# Patient Record
Sex: FEMALE | Race: WHITE | Employment: FULL TIME | URBAN - METROPOLITAN AREA
[De-identification: names, ages, dates, MRNs, and addresses within clinical notes are randomized per-mention and may not be internally consistent; named-entity substitution may affect disease eponyms.]

---

## 2022-10-31 ENCOUNTER — APPOINTMENT (OUTPATIENT)
Dept: GENERAL RADIOLOGY | Age: 32
End: 2022-10-31
Attending: PHYSICIAN ASSISTANT
Payer: COMMERCIAL

## 2022-10-31 ENCOUNTER — HOSPITAL ENCOUNTER (EMERGENCY)
Age: 32
Discharge: HOME | End: 2022-10-31
Attending: EMERGENCY MEDICINE
Payer: COMMERCIAL

## 2022-10-31 VITALS
BODY MASS INDEX: 24.99 KG/M2 | WEIGHT: 150 LBS | RESPIRATION RATE: 13 BRPM | DIASTOLIC BLOOD PRESSURE: 80 MMHG | HEIGHT: 65 IN | HEART RATE: 69 BPM | OXYGEN SATURATION: 99 % | SYSTOLIC BLOOD PRESSURE: 126 MMHG | TEMPERATURE: 98.3 F

## 2022-10-31 DIAGNOSIS — R07.89 ATYPICAL CHEST PAIN: Primary | ICD-10-CM

## 2022-10-31 DIAGNOSIS — R79.89 ELEVATED TSH: ICD-10-CM

## 2022-10-31 LAB
ALBUMIN SERPL-MCNC: 3.9 G/DL (ref 3.4–5)
ALBUMIN/GLOB SERPL: 1 {RATIO} (ref 0.8–1.7)
ALP SERPL-CCNC: 58 U/L (ref 45–117)
ALT SERPL-CCNC: 24 U/L (ref 13–56)
AMPHET UR QL SCN: POSITIVE
ANION GAP SERPL CALC-SCNC: 6 MMOL/L (ref 3–18)
APPEARANCE UR: CLEAR
AST SERPL-CCNC: 12 U/L (ref 10–38)
ATRIAL RATE: 97 BPM
BACTERIA URNS QL MICRO: ABNORMAL /HPF
BARBITURATES UR QL SCN: NEGATIVE
BASOPHILS # BLD: 0 K/UL (ref 0–0.1)
BASOPHILS NFR BLD: 0 % (ref 0–2)
BENZODIAZ UR QL: NEGATIVE
BILIRUB SERPL-MCNC: 0.4 MG/DL (ref 0.2–1)
BILIRUB UR QL: NEGATIVE
BNP SERPL-MCNC: 194 PG/ML (ref 0–450)
BUN SERPL-MCNC: 13 MG/DL (ref 7–18)
BUN/CREAT SERPL: 14 (ref 12–20)
CALCIUM SERPL-MCNC: 9.7 MG/DL (ref 8.5–10.1)
CALCULATED P AXIS, ECG09: 66 DEGREES
CALCULATED R AXIS, ECG10: 38 DEGREES
CALCULATED T AXIS, ECG11: 56 DEGREES
CANNABINOIDS UR QL SCN: POSITIVE
CHLORIDE SERPL-SCNC: 102 MMOL/L (ref 100–111)
CK SERPL-CCNC: 106 U/L (ref 26–192)
CO2 SERPL-SCNC: 26 MMOL/L (ref 21–32)
COCAINE UR QL SCN: NEGATIVE
COLOR UR: YELLOW
CREAT SERPL-MCNC: 0.9 MG/DL (ref 0.6–1.3)
D DIMER PPP FEU-MCNC: 0.29 UG/ML(FEU)
DIAGNOSIS, 93000: NORMAL
DIFFERENTIAL METHOD BLD: ABNORMAL
EOSINOPHIL # BLD: 0.4 K/UL (ref 0–0.4)
EOSINOPHIL NFR BLD: 3 % (ref 0–5)
EPITH CASTS URNS QL MICRO: ABNORMAL /LPF (ref 0–5)
ERYTHROCYTE [DISTWIDTH] IN BLOOD BY AUTOMATED COUNT: 12.3 % (ref 11.6–14.5)
FLUAV RNA SPEC QL NAA+PROBE: NOT DETECTED
FLUBV RNA SPEC QL NAA+PROBE: NOT DETECTED
GLOBULIN SER CALC-MCNC: 4 G/DL (ref 2–4)
GLUCOSE SERPL-MCNC: 77 MG/DL (ref 74–99)
GLUCOSE UR STRIP.AUTO-MCNC: NEGATIVE MG/DL
HCG SERPL QL: NEGATIVE
HCT VFR BLD AUTO: 42.1 % (ref 35–45)
HDSCOM,HDSCOM: ABNORMAL
HGB BLD-MCNC: 14.2 G/DL (ref 12–16)
HGB UR QL STRIP: NEGATIVE
IMM GRANULOCYTES # BLD AUTO: 0 K/UL (ref 0–0.04)
IMM GRANULOCYTES NFR BLD AUTO: 0 % (ref 0–0.5)
KETONES UR QL STRIP.AUTO: NEGATIVE MG/DL
LEUKOCYTE ESTERASE UR QL STRIP.AUTO: ABNORMAL
LIPASE SERPL-CCNC: 142 U/L (ref 73–393)
LYMPHOCYTES # BLD: 3.1 K/UL (ref 0.9–3.6)
LYMPHOCYTES NFR BLD: 25 % (ref 21–52)
MAGNESIUM SERPL-MCNC: 1.8 MG/DL (ref 1.6–2.6)
MCH RBC QN AUTO: 30.1 PG (ref 24–34)
MCHC RBC AUTO-ENTMCNC: 33.7 G/DL (ref 31–37)
MCV RBC AUTO: 89.2 FL (ref 78–100)
METHADONE UR QL: NEGATIVE
MONOCYTES # BLD: 0.7 K/UL (ref 0.05–1.2)
MONOCYTES NFR BLD: 6 % (ref 3–10)
NEUTS SEG # BLD: 8.1 K/UL (ref 1.8–8)
NEUTS SEG NFR BLD: 66 % (ref 40–73)
NITRITE UR QL STRIP.AUTO: NEGATIVE
NRBC # BLD: 0 K/UL (ref 0–0.01)
NRBC BLD-RTO: 0 PER 100 WBC
OPIATES UR QL: NEGATIVE
P-R INTERVAL, ECG05: 138 MS
PCP UR QL: NEGATIVE
PH UR STRIP: 7.5 [PH] (ref 5–8)
PLATELET # BLD AUTO: 318 K/UL (ref 135–420)
PMV BLD AUTO: 10 FL (ref 9.2–11.8)
POTASSIUM SERPL-SCNC: 3.3 MMOL/L (ref 3.5–5.5)
PROT SERPL-MCNC: 7.9 G/DL (ref 6.4–8.2)
PROT UR STRIP-MCNC: NEGATIVE MG/DL
Q-T INTERVAL, ECG07: 366 MS
QRS DURATION, ECG06: 86 MS
QTC CALCULATION (BEZET), ECG08: 464 MS
RBC # BLD AUTO: 4.72 M/UL (ref 4.2–5.3)
RBC #/AREA URNS HPF: ABNORMAL /HPF (ref 0–5)
SARS-COV-2, COV2: NOT DETECTED
SODIUM SERPL-SCNC: 134 MMOL/L (ref 136–145)
SP GR UR REFRACTOMETRY: 1.02 (ref 1–1.03)
TROPONIN-HIGH SENSITIVITY: 5 NG/L (ref 0–54)
TSH SERPL DL<=0.05 MIU/L-ACNC: 5 UIU/ML (ref 0.36–3.74)
UROBILINOGEN UR QL STRIP.AUTO: 0.2 EU/DL (ref 0.2–1)
VENTRICULAR RATE, ECG03: 97 BPM
WBC # BLD AUTO: 12.4 K/UL (ref 4.6–13.2)
WBC URNS QL MICRO: ABNORMAL /HPF (ref 0–5)

## 2022-10-31 PROCEDURE — 85379 FIBRIN DEGRADATION QUANT: CPT

## 2022-10-31 PROCEDURE — 83880 ASSAY OF NATRIURETIC PEPTIDE: CPT

## 2022-10-31 PROCEDURE — 80053 COMPREHEN METABOLIC PANEL: CPT

## 2022-10-31 PROCEDURE — 83735 ASSAY OF MAGNESIUM: CPT

## 2022-10-31 PROCEDURE — 81001 URINALYSIS AUTO W/SCOPE: CPT

## 2022-10-31 PROCEDURE — 84484 ASSAY OF TROPONIN QUANT: CPT

## 2022-10-31 PROCEDURE — 99285 EMERGENCY DEPT VISIT HI MDM: CPT

## 2022-10-31 PROCEDURE — 96374 THER/PROPH/DIAG INJ IV PUSH: CPT

## 2022-10-31 PROCEDURE — 74011250636 HC RX REV CODE- 250/636: Performed by: PHYSICIAN ASSISTANT

## 2022-10-31 PROCEDURE — 80307 DRUG TEST PRSMV CHEM ANLYZR: CPT

## 2022-10-31 PROCEDURE — 96375 TX/PRO/DX INJ NEW DRUG ADDON: CPT

## 2022-10-31 PROCEDURE — 71045 X-RAY EXAM CHEST 1 VIEW: CPT

## 2022-10-31 PROCEDURE — 93005 ELECTROCARDIOGRAM TRACING: CPT

## 2022-10-31 PROCEDURE — 82550 ASSAY OF CK (CPK): CPT

## 2022-10-31 PROCEDURE — 84443 ASSAY THYROID STIM HORMONE: CPT

## 2022-10-31 PROCEDURE — 85025 COMPLETE CBC W/AUTO DIFF WBC: CPT

## 2022-10-31 PROCEDURE — 87636 SARSCOV2 & INF A&B AMP PRB: CPT

## 2022-10-31 PROCEDURE — 84703 CHORIONIC GONADOTROPIN ASSAY: CPT

## 2022-10-31 PROCEDURE — 83690 ASSAY OF LIPASE: CPT

## 2022-10-31 RX ORDER — FLUOXETINE HYDROCHLORIDE 20 MG/1
CAPSULE ORAL DAILY
COMMUNITY

## 2022-10-31 RX ORDER — KETOROLAC TROMETHAMINE 30 MG/ML
30 INJECTION, SOLUTION INTRAMUSCULAR; INTRAVENOUS
Status: COMPLETED | OUTPATIENT
Start: 2022-10-31 | End: 2022-10-31

## 2022-10-31 RX ORDER — FAMOTIDINE 10 MG/ML
20 INJECTION INTRAVENOUS
Status: COMPLETED | OUTPATIENT
Start: 2022-10-31 | End: 2022-10-31

## 2022-10-31 RX ORDER — DEXTROAMPHETAMINE SACCHARATE, AMPHETAMINE ASPARTATE, DEXTROAMPHETAMINE SULFATE AND AMPHETAMINE SULFATE 5; 5; 5; 5 MG/1; MG/1; MG/1; MG/1
20 TABLET ORAL
COMMUNITY

## 2022-10-31 RX ADMIN — FAMOTIDINE 20 MG: 10 INJECTION, SOLUTION INTRAVENOUS at 18:03

## 2022-10-31 RX ADMIN — SODIUM CHLORIDE, POTASSIUM CHLORIDE, SODIUM LACTATE AND CALCIUM CHLORIDE 1000 ML: 600; 310; 30; 20 INJECTION, SOLUTION INTRAVENOUS at 18:02

## 2022-10-31 RX ADMIN — KETOROLAC TROMETHAMINE 30 MG: 30 INJECTION, SOLUTION INTRAMUSCULAR; INTRAVENOUS at 18:03

## 2022-10-31 NOTE — ED PROVIDER NOTES
EMERGENCY DEPARTMENT HISTORY AND PHYSICAL EXAM    Date: 10/31/2022  Patient Name: Becky Nagy    History of Presenting Illness     Chief Complaint   Patient presents with    Arm Pain    Chest Pain         History Provided By: Patient    Chief Complaint: chest pain, shakiness, lightheadedness    HPI(Context):   5:34 PM  Mirella Whitlock is a 28 y.o. female with PMHX of anxiety, ADHD, depression, oral birth control who presents to the emergency department C/O left sided chest pain. Associated sxs include radiation to left armpit and down left arm. Pt has fatigue, SOB, generalized weakness, and difficult concentrating. Pt also has occasional pain in left upper back and right arm. Sxs started this AM. Pt was recently changed from sertraline to Prozac 25 mg for past 2 weeks. Pt is on adderall 20 mg daily. Pt endorses recent travel to [place] for work and pt is on oral birth control. Pt denies fever, chills, vomiting, cough, congestion, rhinorrhea, sick contacts, leg swelling/pain, and any other sxs or complaints. PCP: Melody Loyola MD    Current Outpatient Medications   Medication Sig Dispense Refill    FLUoxetine (PROzac) 20 mg capsule Take  by mouth daily. dextroamphetamine-amphetamine (AdderalL) 20 mg tablet Take 20 mg by mouth. Past History     Past Medical History:  Past Medical History:   Diagnosis Date    Panic attacks        Past Surgical History:  History reviewed. No pertinent surgical history. Family History:  History reviewed. No pertinent family history. Social History:  Social History     Tobacco Use    Smoking status: Never   Substance Use Topics    Alcohol use: Yes     Comment: tiw    Drug use: Yes     Types: Marijuana     Comment: once a month       Allergies:  Not on File      Review of Systems   Review of Systems   Constitutional:  Positive for activity change. Negative for fever. HENT:  Negative for congestion, rhinorrhea and sore throat.     Respiratory:  Positive for shortness of breath. Cardiovascular:  Positive for chest pain. Negative for leg swelling. Gastrointestinal:  Negative for abdominal pain, nausea and vomiting. Musculoskeletal:  Positive for back pain and myalgias. Neurological:  Positive for weakness (generalized weakness) and light-headedness. All other systems reviewed and are negative. Physical Exam     Vitals:    10/31/22 1714 10/31/22 1802 10/31/22 1847 10/31/22 1918   BP: (!) 147/93 (!) 151/108 (!) 134/93 126/80   Pulse: 72 73 66 69   Resp: 11 14 16 13   Temp:       SpO2:  99% 100% 99%   Weight:       Height:         Physical Exam  Vitals and nursing note reviewed. Constitutional:       General: She is not in acute distress. Appearance: She is well-developed. She is not diaphoretic. Comments:  female in NAD. Alert. Answering questions. Following directions.  at bedside. HENT:      Head: Normocephalic and atraumatic. Jaw: No trismus. Right Ear: External ear normal. No swelling or tenderness. Tympanic membrane is not perforated, erythematous or bulging. Left Ear: External ear normal. No swelling or tenderness. Tympanic membrane is not perforated, erythematous or bulging. Nose: Nose normal. No mucosal edema, congestion or rhinorrhea. Mouth/Throat:      Mouth: Mucous membranes are dry. No oral lesions. Dentition: No dental abscesses. Pharynx: Uvula midline. No oropharyngeal exudate, posterior oropharyngeal erythema or uvula swelling. Tonsils: No tonsillar abscesses. Eyes:      General: No scleral icterus. Right eye: No discharge. Left eye: No discharge. Conjunctiva/sclera: Conjunctivae normal.   Cardiovascular:      Rate and Rhythm: Normal rate and regular rhythm. Heart sounds: Normal heart sounds. No murmur heard. No friction rub. No gallop.    Pulmonary:      Effort: Pulmonary effort is normal. No tachypnea, accessory muscle usage or respiratory distress. Breath sounds: Normal breath sounds. No decreased breath sounds, wheezing, rhonchi or rales. Musculoskeletal:         General: Normal range of motion. Cervical back: Normal range of motion. Right lower leg: No edema. Left lower leg: No edema. Skin:     General: Skin is warm and dry. Neurological:      Mental Status: She is alert and oriented to person, place, and time. GCS: GCS eye subscore is 4. GCS verbal subscore is 5. GCS motor subscore is 6. Cranial Nerves: Cranial nerves 2-12 are intact. Sensory: Sensation is intact. Motor: Motor function is intact. Comments: A&Ox4. Psychiatric:         Judgment: Judgment normal.           Diagnostic Study Results     Labs -     Recent Results (from the past 12 hour(s))   EKG, 12 LEAD, INITIAL    Collection Time: 10/31/22  3:04 PM   Result Value Ref Range    Ventricular Rate 97 BPM    Atrial Rate 97 BPM    P-R Interval 138 ms    QRS Duration 86 ms    Q-T Interval 366 ms    QTC Calculation (Bezet) 464 ms    Calculated P Axis 66 degrees    Calculated R Axis 38 degrees    Calculated T Axis 56 degrees    Diagnosis       Normal sinus rhythm  Normal ECG  No previous ECGs available     CBC WITH AUTOMATED DIFF    Collection Time: 10/31/22  4:45 PM   Result Value Ref Range    WBC 12.4 4.6 - 13.2 K/uL    RBC 4.72 4.20 - 5.30 M/uL    HGB 14.2 12.0 - 16.0 g/dL    HCT 42.1 35.0 - 45.0 %    MCV 89.2 78.0 - 100.0 FL    MCH 30.1 24.0 - 34.0 PG    MCHC 33.7 31.0 - 37.0 g/dL    RDW 12.3 11.6 - 14.5 %    PLATELET 967 687 - 890 K/uL    MPV 10.0 9.2 - 11.8 FL    NRBC 0.0 0  WBC    ABSOLUTE NRBC 0.00 0.00 - 0.01 K/uL    NEUTROPHILS 66 40 - 73 %    LYMPHOCYTES 25 21 - 52 %    MONOCYTES 6 3 - 10 %    EOSINOPHILS 3 0 - 5 %    BASOPHILS 0 0 - 2 %    IMMATURE GRANULOCYTES 0 0.0 - 0.5 %    ABS. NEUTROPHILS 8.1 (H) 1.8 - 8.0 K/UL    ABS. LYMPHOCYTES 3.1 0.9 - 3.6 K/UL    ABS. MONOCYTES 0.7 0.05 - 1.2 K/UL    ABS.  EOSINOPHILS 0.4 0.0 - 0.4 K/UL    ABS. BASOPHILS 0.0 0.0 - 0.1 K/UL    ABS. IMM. GRANS. 0.0 0.00 - 0.04 K/UL    DF AUTOMATED     METABOLIC PANEL, COMPREHENSIVE    Collection Time: 10/31/22  4:45 PM   Result Value Ref Range    Sodium 134 (L) 136 - 145 mmol/L    Potassium 3.3 (L) 3.5 - 5.5 mmol/L    Chloride 102 100 - 111 mmol/L    CO2 26 21 - 32 mmol/L    Anion gap 6 3.0 - 18 mmol/L    Glucose 77 74 - 99 mg/dL    BUN 13 7.0 - 18 MG/DL    Creatinine 0.90 0.6 - 1.3 MG/DL    BUN/Creatinine ratio 14 12 - 20      eGFR >60 >60 ml/min/1.73m2    Calcium 9.7 8.5 - 10.1 MG/DL    Bilirubin, total 0.4 0.2 - 1.0 MG/DL    ALT (SGPT) 24 13 - 56 U/L    AST (SGOT) 12 10 - 38 U/L    Alk.  phosphatase 58 45 - 117 U/L    Protein, total 7.9 6.4 - 8.2 g/dL    Albumin 3.9 3.4 - 5.0 g/dL    Globulin 4.0 2.0 - 4.0 g/dL    A-G Ratio 1.0 0.8 - 1.7     LIPASE    Collection Time: 10/31/22  4:45 PM   Result Value Ref Range    Lipase 142 73 - 393 U/L   MAGNESIUM    Collection Time: 10/31/22  4:45 PM   Result Value Ref Range    Magnesium 1.8 1.6 - 2.6 mg/dL   TROPONIN-HIGH SENSITIVITY    Collection Time: 10/31/22  4:45 PM   Result Value Ref Range    Troponin-High Sensitivity 5 0 - 54 ng/L   D DIMER    Collection Time: 10/31/22  4:45 PM   Result Value Ref Range    D DIMER 0.29 <0.46 ug/ml(FEU)   NT-PRO BNP    Collection Time: 10/31/22  4:45 PM   Result Value Ref Range    NT pro- 0 - 450 PG/ML   HCG QL SERUM    Collection Time: 10/31/22  4:45 PM   Result Value Ref Range    HCG, Ql. Negative NEG     URINALYSIS W/ RFLX MICROSCOPIC    Collection Time: 10/31/22  4:45 PM   Result Value Ref Range    Color YELLOW      Appearance CLEAR      Specific gravity 1.018 1.005 - 1.030      pH (UA) 7.5 5.0 - 8.0      Protein Negative NEG mg/dL    Glucose Negative NEG mg/dL    Ketone Negative NEG mg/dL    Bilirubin Negative NEG      Blood Negative NEG      Urobilinogen 0.2 0.2 - 1.0 EU/dL    Nitrites Negative NEG      Leukocyte Esterase TRACE (A) NEG     DRUG SCREEN, URINE Collection Time: 10/31/22  4:45 PM   Result Value Ref Range    BENZODIAZEPINES Negative NEG      BARBITURATES Negative NEG      THC (TH-CANNABINOL) Positive (A) NEG      OPIATES Negative NEG      PCP(PHENCYCLIDINE) Negative NEG      COCAINE Negative NEG      AMPHETAMINES Positive (A) NEG      METHADONE Negative NEG      HDSCOM (NOTE)    CK    Collection Time: 10/31/22  4:45 PM   Result Value Ref Range     26 - 192 U/L   URINE MICROSCOPIC ONLY    Collection Time: 10/31/22  4:45 PM   Result Value Ref Range    WBC 0 to 3 0 - 5 /hpf    RBC 0 to 1 0 - 5 /hpf    Epithelial cells 2+ 0 - 5 /lpf    Bacteria FEW (A) NEG /hpf   TSH 3RD GENERATION    Collection Time: 10/31/22  4:45 PM   Result Value Ref Range    TSH 5.00 (H) 0.36 - 3.74 uIU/mL   COVID-19 WITH INFLUENZA A/B    Collection Time: 10/31/22  6:00 PM   Result Value Ref Range    SARS-CoV-2 by PCR Not detected NOTD      Influenza A by PCR Not detected NOTD      Influenza B by PCR Not detected NOTD         Radiologic Studies -  XR CHEST PORT   Final Result      No acute cardiopulmonary process. CT Results  (Last 48 hours)      None          CXR Results  (Last 48 hours)                 10/31/22 1803  XR CHEST PORT Final result    Impression:      No acute cardiopulmonary process. Narrative:  CLINICAL HISTORY:  As above. COMPARISONS:  None. TECHNIQUE:  single frontal view of the chest       ------------------------------------------       FINDINGS:       Lungs:  No consolidation or pleural fluid. Mediastinum: Unremarkable.        Bones: No evidence of fracture or suspicious bone lesion.           ------------------------------------------               Medications given in the ED-  Medications   lactated ringers bolus infusion 1,000 mL (0 mL IntraVENous IV Completed 10/31/22 1832)   famotidine (PF) (PEPCID) injection 20 mg (20 mg IntraVENous Given 10/31/22 1803)   ketorolac (TORADOL) injection 30 mg (30 mg IntraVENous Given 10/31/22 26)         Medical Decision Making   I am the first provider for this patient. I reviewed the vital signs, available nursing notes, past medical history, past surgical history, family history and social history. Vital Signs-Reviewed the patient's vital signs. Pulse Oximetry Analysis - 99% on RA. NORMAL     Records Reviewed: Nursing Notes    Provider Notes (Medical Decision Making): ACS/MI, PE, arrhythmia, pericarditis, myocarditis, GERD, PNA, COPD, CHF, asthma/RAD, influenza, COVID, metabolic derangement, anxiety. Doubt serotonin syndrome    Procedures:  Procedures    ED Course:   5:34 PM Initial assessment performed. The patients presenting problems have been discussed, and they are in agreement with the care plan formulated and outlined with them. I have encouraged them to ask questions as they arise throughout their visit. Diagnosis and Disposition       Cardiac workup unremarkable. DDimer negative. HEART score 0. Pt will FU with PCP for recheck of slightly elevated TSH. FU with cardiology for CP. Reasons to RTED discussed with pt. All questions answered. Pt feels comfortable going home at this time. Pt expressed understanding and she agrees with plan. 1. Atypical chest pain    2. Elevated TSH        PLAN:  1. D/C Home  2. Discharge Medication List as of 10/31/2022  7:41 PM        3. Follow-up Information       Follow up With Specialties Details Why Contact Info    Yael Aldana MD Cardiovascular Disease Physician, Internal Medicine Physician  follow up with cardiology for outpatient cardiac stress test [facility]  [phone]      your PCP        [facility] EMERGENCY DEPT Emergency Medicine   [address] [facility]  [phone]          _______________________________    Attestations:   This note is prepared by Brandon Pruitt PA-C.  _______________________________        Please note that this dictation was completed with Dragon, the computer voice recognition software. Quite often unanticipated grammatical, syntax, homophones, and other interpretive errors are inadvertently transcribed by the computer software. Please disregard these errors. Please excuse any errors that have escaped final proofreading.